# Patient Record
Sex: FEMALE | Race: WHITE
[De-identification: names, ages, dates, MRNs, and addresses within clinical notes are randomized per-mention and may not be internally consistent; named-entity substitution may affect disease eponyms.]

---

## 2020-03-09 ENCOUNTER — APPOINTMENT (OUTPATIENT)
Dept: MRI IMAGING | Facility: CLINIC | Age: 50
End: 2020-03-09

## 2020-11-09 ENCOUNTER — APPOINTMENT (OUTPATIENT)
Dept: ULTRASOUND IMAGING | Facility: CLINIC | Age: 50
End: 2020-11-09
Payer: COMMERCIAL

## 2020-11-09 PROCEDURE — 99072 ADDL SUPL MATRL&STAF TM PHE: CPT

## 2020-11-09 PROCEDURE — 76641 ULTRASOUND BREAST COMPLETE: CPT | Mod: 50

## 2020-11-09 PROCEDURE — 76536 US EXAM OF HEAD AND NECK: CPT

## 2020-11-24 PROBLEM — Z00.00 ENCOUNTER FOR PREVENTIVE HEALTH EXAMINATION: Noted: 2020-11-24

## 2021-03-01 ENCOUNTER — APPOINTMENT (OUTPATIENT)
Dept: RADIOLOGY | Facility: CLINIC | Age: 51
End: 2021-03-01
Payer: COMMERCIAL

## 2021-03-01 PROCEDURE — 73552 X-RAY EXAM OF FEMUR 2/>: CPT | Mod: LT

## 2021-03-01 PROCEDURE — 73502 X-RAY EXAM HIP UNI 2-3 VIEWS: CPT | Mod: LT

## 2021-03-26 ENCOUNTER — TRANSCRIPTION ENCOUNTER (OUTPATIENT)
Age: 51
End: 2021-03-26

## 2021-03-30 ENCOUNTER — APPOINTMENT (OUTPATIENT)
Dept: MRI IMAGING | Facility: CLINIC | Age: 51
End: 2021-03-30
Payer: COMMERCIAL

## 2021-03-30 PROCEDURE — 70551 MRI BRAIN STEM W/O DYE: CPT

## 2021-03-30 PROCEDURE — 73721 MRI JNT OF LWR EXTRE W/O DYE: CPT | Mod: LT

## 2021-05-03 PROBLEM — Z00.00 ENCOUNTER FOR PREVENTIVE HEALTH EXAMINATION: Status: ACTIVE | Noted: 2021-05-03

## 2022-02-10 ENCOUNTER — APPOINTMENT (OUTPATIENT)
Dept: MAMMOGRAPHY | Facility: CLINIC | Age: 52
End: 2022-02-10
Payer: COMMERCIAL

## 2022-02-10 PROCEDURE — 77063 BREAST TOMOSYNTHESIS BI: CPT

## 2022-02-10 PROCEDURE — 77067 SCR MAMMO BI INCL CAD: CPT

## 2022-02-24 ENCOUNTER — APPOINTMENT (OUTPATIENT)
Dept: ULTRASOUND IMAGING | Facility: CLINIC | Age: 52
End: 2022-02-24
Payer: COMMERCIAL

## 2022-02-24 ENCOUNTER — APPOINTMENT (OUTPATIENT)
Dept: MAMMOGRAPHY | Facility: CLINIC | Age: 52
End: 2022-02-24
Payer: COMMERCIAL

## 2022-02-24 PROCEDURE — 77065 DX MAMMO INCL CAD UNI: CPT | Mod: LT

## 2022-02-24 PROCEDURE — 76642 ULTRASOUND BREAST LIMITED: CPT | Mod: LT

## 2022-02-24 PROCEDURE — G0279: CPT | Mod: LT

## 2022-03-10 ENCOUNTER — TRANSCRIPTION ENCOUNTER (OUTPATIENT)
Age: 52
End: 2022-03-10

## 2022-03-31 ENCOUNTER — APPOINTMENT (OUTPATIENT)
Dept: ULTRASOUND IMAGING | Facility: CLINIC | Age: 52
End: 2022-03-31
Payer: COMMERCIAL

## 2022-03-31 PROCEDURE — 76536 US EXAM OF HEAD AND NECK: CPT

## 2022-07-25 ENCOUNTER — APPOINTMENT (OUTPATIENT)
Dept: PODIATRY | Facility: CLINIC | Age: 52
End: 2022-07-25

## 2022-07-25 DIAGNOSIS — Z86.39 PERSONAL HISTORY OF OTHER ENDOCRINE, NUTRITIONAL AND METABOLIC DISEASE: ICD-10-CM

## 2022-07-25 DIAGNOSIS — Z78.9 OTHER SPECIFIED HEALTH STATUS: ICD-10-CM

## 2022-07-25 DIAGNOSIS — M76.72 PERONEAL TENDINITIS, LEFT LEG: ICD-10-CM

## 2022-07-25 DIAGNOSIS — S93.492A SPRAIN OF OTHER LIGAMENT OF LEFT ANKLE, INITIAL ENCOUNTER: ICD-10-CM

## 2022-07-25 PROCEDURE — 73610 X-RAY EXAM OF ANKLE: CPT | Mod: LT

## 2022-07-25 PROCEDURE — 99203 OFFICE O/P NEW LOW 30 MIN: CPT

## 2022-07-25 PROCEDURE — 99072 ADDL SUPL MATRL&STAF TM PHE: CPT

## 2022-07-25 RX ORDER — LEVOTHYROXINE SODIUM 0.12 MG/1
125 TABLET ORAL
Qty: 90 | Refills: 0 | Status: ACTIVE | COMMUNITY
Start: 2022-06-22

## 2022-07-25 NOTE — ASSESSMENT
[FreeTextEntry1] : NSAID OF CHOICE.\par TYLENOL\par WARM COMPRESSES.\par USE CAM BOOT FOR WALKING IF PAIN OCCURS. \par X RAYS NEGATIVE.

## 2022-07-25 NOTE — PHYSICAL EXAM
[5___] : [facility] 5[unfilled]/5 [2+] : posterior tibialis pulse: 2+ [Normal] : saphenous nerve sensation normal [] : mildly antalgic [Left] : left ankle [There are no fractures, subluxations or dislocations. No significant abnormalities are seen] : There are no fractures, subluxations or dislocations. No significant abnormalities are seen [FreeTextEntry3] : PERONEAL TENDON PAIN.

## 2022-07-25 NOTE — REASON FOR VISIT
[FreeTextEntry2] : LT ankle injury.  HURT IT YESTERDAY AFTERNOON.  ICED AND PAIN UP INTO OUTSIDE OF ANKLE

## 2022-07-25 NOTE — HISTORY OF PRESENT ILLNESS
[de-identified] : Patient states she rolled the ankle while using a pool on Saturday. She is having pain in the lateral ankle. She has been taking Advil and using ice since the injury.

## 2022-10-19 ENCOUNTER — APPOINTMENT (OUTPATIENT)
Dept: MRI IMAGING | Facility: CLINIC | Age: 52
End: 2022-10-19

## 2022-10-19 PROCEDURE — A9585: CPT | Mod: JW

## 2022-10-19 PROCEDURE — 77049 MRI BREAST C-+ W/CAD BI: CPT

## 2023-04-28 ENCOUNTER — APPOINTMENT (OUTPATIENT)
Dept: ULTRASOUND IMAGING | Facility: CLINIC | Age: 53
End: 2023-04-28

## 2023-04-28 ENCOUNTER — APPOINTMENT (OUTPATIENT)
Dept: MAMMOGRAPHY | Facility: CLINIC | Age: 53
End: 2023-04-28
Payer: COMMERCIAL

## 2023-04-28 PROCEDURE — 76641 ULTRASOUND BREAST COMPLETE: CPT | Mod: LT

## 2023-04-28 PROCEDURE — 77063 BREAST TOMOSYNTHESIS BI: CPT

## 2023-04-28 PROCEDURE — 77067 SCR MAMMO BI INCL CAD: CPT | Mod: RT

## 2023-05-01 ENCOUNTER — OFFICE (OUTPATIENT)
Dept: URBAN - METROPOLITAN AREA CLINIC 8 | Facility: CLINIC | Age: 53
Setting detail: OPHTHALMOLOGY
End: 2023-05-01
Payer: COMMERCIAL

## 2023-05-01 DIAGNOSIS — H04.123: ICD-10-CM

## 2023-05-01 DIAGNOSIS — H25.13: ICD-10-CM

## 2023-05-01 PROCEDURE — 92014 COMPRE OPH EXAM EST PT 1/>: CPT | Performed by: OPHTHALMOLOGY

## 2023-05-01 ASSESSMENT — VISUAL ACUITY
OS_BCVA: 20/25-
OD_BCVA: 20/20-

## 2023-05-01 ASSESSMENT — KERATOMETRY
OD_K1POWER_DIOPTERS: 41.00
METHOD_AUTO_MANUAL: AUTO
OS_AXISANGLE_DEGREES: 076
OD_AXISANGLE_DEGREES: 102
OS_K1POWER_DIOPTERS: 41.00
OS_K2POWER_DIOPTERS: 42.00
OD_K2POWER_DIOPTERS: 41.75

## 2023-05-01 ASSESSMENT — REFRACTION_MANIFEST
OD_VA1: 20/20-2
OD_SPHERE: +0.50
OS_CYLINDER: SPH
OD_VA2: 20/20(J1+)
OS_ADD: +2.25
OD_VA2: 20/20(J1+)
OD_CYLINDER: SPH
OD_CYLINDER: SPH
OD_ADD: +2.00
OD_VA1: 20/20-2
OS_CYLINDER: SPH
OS_VA1: 20/20
OS_VA2: 20/20(J1+)
OS_VA2: 20/20(J1+)
OS_VA1: 20/20
OU_VA: 20/20
OD_ADD: +2.25
OU_VA: 20/20
OS_SPHERE: PLANO
OS_SPHERE: PLANO
OS_ADD: +2.00
OD_SPHERE: +0.50

## 2023-05-01 ASSESSMENT — REFRACTION_AUTOREFRACTION
OS_AXIS: 118
OS_SPHERE: +1.50
OD_SPHERE: +1.75
OD_CYLINDER: -0.75
OS_CYLINDER: -0.75
OD_AXIS: 071

## 2023-05-01 ASSESSMENT — TEAR BREAK UP TIME (TBUT)
OS_TBUT: 8-10
OD_TBUT: 8-10

## 2023-05-01 ASSESSMENT — CONFRONTATIONAL VISUAL FIELD TEST (CVF)
OD_FINDINGS: FULL
OS_FINDINGS: FULL

## 2023-05-01 ASSESSMENT — SPHEQUIV_DERIVED
OS_SPHEQUIV: 1.125
OD_SPHEQUIV: 1.375

## 2023-05-01 ASSESSMENT — REFRACTION_CURRENTRX
OS_VPRISM_DIRECTION: SV
OS_SPHERE: +2.00
OD_VPRISM_DIRECTION: SV
OS_OVR_VA: 20/
OD_OVR_VA: 20/
OD_SPHERE: +2.00
OS_CYLINDER: SPH
OD_CYLINDER: SPH

## 2023-05-01 ASSESSMENT — TONOMETRY
OS_IOP_MMHG: 13
OD_IOP_MMHG: 13

## 2023-05-01 ASSESSMENT — AXIALLENGTH_DERIVED
OS_AL: 23.892
OD_AL: 23.8393

## 2023-11-03 ENCOUNTER — APPOINTMENT (OUTPATIENT)
Dept: ULTRASOUND IMAGING | Facility: CLINIC | Age: 53
End: 2023-11-03
Payer: COMMERCIAL

## 2023-11-03 PROCEDURE — 76536 US EXAM OF HEAD AND NECK: CPT

## 2024-03-13 ENCOUNTER — APPOINTMENT (OUTPATIENT)
Dept: MRI IMAGING | Facility: CLINIC | Age: 54
End: 2024-03-13
Payer: COMMERCIAL

## 2024-03-13 PROCEDURE — 77049 MRI BREAST C-+ W/CAD BI: CPT

## 2024-03-13 PROCEDURE — A9585: CPT | Mod: JW

## 2024-04-29 ENCOUNTER — APPOINTMENT (OUTPATIENT)
Dept: RADIOLOGY | Facility: CLINIC | Age: 54
End: 2024-04-29
Payer: COMMERCIAL

## 2024-04-29 PROCEDURE — 70260 X-RAY EXAM OF SKULL: CPT

## 2024-06-13 ENCOUNTER — APPOINTMENT (OUTPATIENT)
Dept: ULTRASOUND IMAGING | Facility: CLINIC | Age: 54
End: 2024-06-13
Payer: COMMERCIAL

## 2024-06-13 PROCEDURE — 76536 US EXAM OF HEAD AND NECK: CPT

## 2025-02-17 ENCOUNTER — APPOINTMENT (OUTPATIENT)
Dept: MAMMOGRAPHY | Facility: CLINIC | Age: 55
End: 2025-02-17
Payer: COMMERCIAL

## 2025-02-17 PROCEDURE — 77067 SCR MAMMO BI INCL CAD: CPT

## 2025-02-17 PROCEDURE — 77063 BREAST TOMOSYNTHESIS BI: CPT

## 2025-04-07 ENCOUNTER — APPOINTMENT (OUTPATIENT)
Dept: ORTHOPEDIC SURGERY | Facility: CLINIC | Age: 55
End: 2025-04-07
Payer: COMMERCIAL

## 2025-04-07 DIAGNOSIS — M25.811 OTHER SPECIFIED JOINT DISORDERS, RIGHT SHOULDER: ICD-10-CM

## 2025-04-07 DIAGNOSIS — S46.011A STRAIN OF MUSCLE(S) AND TENDON(S) OF THE ROTATOR CUFF OF RIGHT SHOULDER, INITIAL ENCOUNTER: ICD-10-CM

## 2025-04-07 DIAGNOSIS — M25.819 OTHER SPECIFIED JOINT DISORDERS, UNSPECIFIED SHOULDER: ICD-10-CM

## 2025-04-07 PROCEDURE — 99204 OFFICE O/P NEW MOD 45 MIN: CPT

## 2025-04-07 PROCEDURE — 73030 X-RAY EXAM OF SHOULDER: CPT | Mod: RT

## 2025-04-07 RX ORDER — LEVOTHYROXINE SODIUM 200 MCG
VIAL (EA) INTRAVENOUS
Refills: 0 | Status: ACTIVE | COMMUNITY

## 2025-04-07 RX ORDER — CELECOXIB 200 MG/1
200 CAPSULE ORAL
Qty: 30 | Refills: 0 | Status: ACTIVE | COMMUNITY
Start: 2025-04-07 | End: 1900-01-01

## 2025-04-17 ENCOUNTER — OFFICE (OUTPATIENT)
Dept: URBAN - METROPOLITAN AREA CLINIC 97 | Facility: CLINIC | Age: 55
Setting detail: OPHTHALMOLOGY
End: 2025-04-17
Payer: COMMERCIAL

## 2025-04-17 DIAGNOSIS — H43.811: ICD-10-CM

## 2025-04-17 DIAGNOSIS — H04.123: ICD-10-CM

## 2025-04-17 DIAGNOSIS — H52.4: ICD-10-CM

## 2025-04-17 DIAGNOSIS — H25.13: ICD-10-CM

## 2025-04-17 PROCEDURE — 92015 DETERMINE REFRACTIVE STATE: CPT

## 2025-04-17 PROCEDURE — 92014 COMPRE OPH EXAM EST PT 1/>: CPT

## 2025-04-17 ASSESSMENT — REFRACTION_CURRENTRX
OD_ADD: +0.75
OS_CYLINDER: SPHERE
OD_VPRISM_DIRECTION: PROGS
OS_SPHERE: +1.75
OD_CYLINDER: SPHERE
OD_OVR_VA: 20/
OD_CYLINDER: SPHERE
OD_VPRISM_DIRECTION: PROGS
OS_CYLINDER: SPHERE
OS_ADD: +0.75
OS_VPRISM_DIRECTION: PROGS
OD_OVR_VA: 20/
OS_SPHERE: +1.75
OS_VPRISM_DIRECTION: PROGS
OD_SPHERE: +2.00
OD_ADD: +0.50
OS_OVR_VA: 20/
OS_OVR_VA: 20/
OS_ADD: +0.50
OD_SPHERE: +2.00

## 2025-04-17 ASSESSMENT — REFRACTION_MANIFEST
OD_CYLINDER: +0.75
OD_VA2: 20/20(J1+)
OS_VA1: 20/20
OS_CYLINDER: +0.75
OD_SPHERE: +0.75
OS_VA2: 20/20(J1+)
OS_VA1: 20/20
OU_VA: 20/15
OS_ADD: +0.75
OD_VA1: 20/20
OS_AXIS: 015
OD_AXIS: 160
OS_ADD: +2.25
OD_ADD: +0.75
OS_SPHERE: +1.75
OD_AXIS: 160
OS_AXIS: 015
OS_VA2: 20/20(J1+)
OD_SPHERE: +0.75
OS_CYLINDER: +0.75
OD_ADD: +2.25
OS_AXIS: 015
OD_VA1: 20/20
OS_ADD: +2.25
OS_SPHERE: +0.25
OD_VA2: 20/20(J1+)
OS_CYLINDER: +0.75
OD_AXIS: 160
OD_SPHERE: +2.25
OD_CYLINDER: +0.75
OU_VA: 20/15
OD_CYLINDER: +0.75
OD_ADD: +2.25
OS_SPHERE: +0.50

## 2025-04-17 ASSESSMENT — TEAR BREAK UP TIME (TBUT)
OD_TBUT: 8-10
OS_TBUT: 8-10

## 2025-04-17 ASSESSMENT — KERATOMETRY
OS_K1POWER_DIOPTERS: 40.75
OD_K2POWER_DIOPTERS: 41.75
OD_AXISANGLE_DEGREES: 101
OS_K2POWER_DIOPTERS: 42.00
METHOD_AUTO_MANUAL: AUTO
OD_K1POWER_DIOPTERS: 41.00
OS_AXISANGLE_DEGREES: 078

## 2025-04-17 ASSESSMENT — REFRACTION_AUTOREFRACTION
OS_SPHERE: +0.50
OS_AXIS: 013
OD_SPHERE: +0.75
OD_CYLINDER: +0.75
OS_CYLINDER: +0.75
OD_AXIS: 159

## 2025-04-17 ASSESSMENT — CONFRONTATIONAL VISUAL FIELD TEST (CVF)
OD_FINDINGS: FULL
OS_FINDINGS: FULL

## 2025-04-17 ASSESSMENT — VISUAL ACUITY
OD_BCVA: 20/20-2
OS_BCVA: 20/25-2

## 2025-04-17 ASSESSMENT — TONOMETRY
OS_IOP_MMHG: 14
OD_IOP_MMHG: 14

## 2025-04-30 ENCOUNTER — RX RENEWAL (OUTPATIENT)
Age: 55
End: 2025-04-30

## 2025-05-21 ENCOUNTER — APPOINTMENT (OUTPATIENT)
Dept: ORTHOPEDIC SURGERY | Facility: CLINIC | Age: 55
End: 2025-05-21

## 2025-05-28 ENCOUNTER — RX RENEWAL (OUTPATIENT)
Age: 55
End: 2025-05-28

## 2025-07-24 ENCOUNTER — APPOINTMENT (OUTPATIENT)
Dept: ORTHOPEDIC SURGERY | Facility: CLINIC | Age: 55
End: 2025-07-24
Payer: COMMERCIAL

## 2025-07-24 DIAGNOSIS — M75.111 INCOMPLETE ROTATOR CUFF TEAR OR RUPTURE OF RIGHT SHOULDER, NOT SPECIFIED AS TRAUMATIC: ICD-10-CM

## 2025-07-24 DIAGNOSIS — M25.811 OTHER SPECIFIED JOINT DISORDERS, RIGHT SHOULDER: ICD-10-CM

## 2025-07-24 PROCEDURE — 99213 OFFICE O/P EST LOW 20 MIN: CPT

## 2025-08-06 ENCOUNTER — RESULT REVIEW (OUTPATIENT)
Age: 55
End: 2025-08-06

## 2025-08-06 ENCOUNTER — APPOINTMENT (OUTPATIENT)
Dept: MRI IMAGING | Facility: CLINIC | Age: 55
End: 2025-08-06
Payer: COMMERCIAL

## 2025-08-06 PROCEDURE — 73221 MRI JOINT UPR EXTREM W/O DYE: CPT | Mod: RT

## 2025-09-10 ENCOUNTER — APPOINTMENT (OUTPATIENT)
Dept: MRI IMAGING | Facility: CLINIC | Age: 55
End: 2025-09-10

## 2025-09-11 ENCOUNTER — APPOINTMENT (OUTPATIENT)
Dept: ORTHOPEDIC SURGERY | Facility: CLINIC | Age: 55
End: 2025-09-11
Payer: COMMERCIAL

## 2025-09-11 DIAGNOSIS — M75.111 INCOMPLETE ROTATOR CUFF TEAR OR RUPTURE OF RIGHT SHOULDER, NOT SPECIFIED AS TRAUMATIC: ICD-10-CM

## 2025-09-11 DIAGNOSIS — M25.811 OTHER SPECIFIED JOINT DISORDERS, RIGHT SHOULDER: ICD-10-CM

## 2025-09-11 PROCEDURE — 20611 DRAIN/INJ JOINT/BURSA W/US: CPT | Mod: RT

## 2025-09-11 PROCEDURE — 99214 OFFICE O/P EST MOD 30 MIN: CPT | Mod: 25
